# Patient Record
Sex: MALE | Race: WHITE | NOT HISPANIC OR LATINO | URBAN - METROPOLITAN AREA
[De-identification: names, ages, dates, MRNs, and addresses within clinical notes are randomized per-mention and may not be internally consistent; named-entity substitution may affect disease eponyms.]

---

## 2017-06-25 PROBLEM — Z00.00 ENCOUNTER FOR PREVENTIVE HEALTH EXAMINATION: Status: ACTIVE | Noted: 2017-06-25

## 2017-06-27 ENCOUNTER — EMERGENCY (EMERGENCY)
Facility: HOSPITAL | Age: 42
LOS: 0 days | Discharge: HOME | End: 2017-06-27
Attending: STUDENT IN AN ORGANIZED HEALTH CARE EDUCATION/TRAINING PROGRAM
Payer: OTHER MISCELLANEOUS

## 2017-06-27 VITALS
TEMPERATURE: 98 F | HEIGHT: 68 IN | DIASTOLIC BLOOD PRESSURE: 56 MMHG | HEART RATE: 91 BPM | OXYGEN SATURATION: 97 % | RESPIRATION RATE: 16 BRPM | SYSTOLIC BLOOD PRESSURE: 96 MMHG | WEIGHT: 270.07 LBS

## 2017-06-27 PROCEDURE — 73562 X-RAY EXAM OF KNEE 3: CPT | Mod: 26,RT

## 2017-06-27 PROCEDURE — 72100 X-RAY EXAM L-S SPINE 2/3 VWS: CPT | Mod: 26

## 2017-06-27 PROCEDURE — 72070 X-RAY EXAM THORAC SPINE 2VWS: CPT | Mod: 26

## 2017-06-27 PROCEDURE — 99284 EMERGENCY DEPT VISIT MOD MDM: CPT

## 2017-06-27 PROCEDURE — 73130 X-RAY EXAM OF HAND: CPT | Mod: 26,RT

## 2017-06-27 NOTE — ED PROVIDER NOTE - CARE PLAN
Principal Discharge DX:	Back strain  Secondary Diagnosis:	Contusion of right hand, sequela  Secondary Diagnosis:	Contusion of right knee, sequela
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
## 2017-06-28 DIAGNOSIS — S60.221A CONTUSION OF RIGHT HAND, INITIAL ENCOUNTER: ICD-10-CM
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
## 2017-06-28 DIAGNOSIS — S39.012A STRAIN OF MUSCLE, FASCIA AND TENDON OF LOWER BACK, INITIAL ENCOUNTER: ICD-10-CM
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
## 2017-06-28 DIAGNOSIS — S80.01XA CONTUSION OF RIGHT KNEE, INITIAL ENCOUNTER: ICD-10-CM

## 2017-06-28 DIAGNOSIS — M54.5 LOW BACK PAIN: ICD-10-CM

## 2017-06-28 DIAGNOSIS — E03.9 HYPOTHYROIDISM, UNSPECIFIED: ICD-10-CM

## 2017-06-30 ENCOUNTER — EMERGENCY (EMERGENCY)
Facility: HOSPITAL | Age: 42
LOS: 0 days | Discharge: HOME | End: 2017-07-01

## 2017-06-30 DIAGNOSIS — E03.9 HYPOTHYROIDISM, UNSPECIFIED: ICD-10-CM

## 2017-06-30 DIAGNOSIS — K08.89 OTHER SPECIFIED DISORDERS OF TEETH AND SUPPORTING STRUCTURES: ICD-10-CM

## 2017-06-30 DIAGNOSIS — Z88.0 ALLERGY STATUS TO PENICILLIN: ICD-10-CM

## 2017-06-30 DIAGNOSIS — Z79.899 OTHER LONG TERM (CURRENT) DRUG THERAPY: ICD-10-CM

## 2017-06-30 DIAGNOSIS — K04.7 PERIAPICAL ABSCESS WITHOUT SINUS: ICD-10-CM

## 2017-07-31 ENCOUNTER — EMERGENCY (EMERGENCY)
Facility: HOSPITAL | Age: 42
LOS: 0 days | Discharge: HOME | End: 2017-07-31

## 2017-07-31 DIAGNOSIS — E03.9 HYPOTHYROIDISM, UNSPECIFIED: ICD-10-CM

## 2017-07-31 DIAGNOSIS — Z79.899 OTHER LONG TERM (CURRENT) DRUG THERAPY: ICD-10-CM

## 2017-07-31 DIAGNOSIS — Z76.0 ENCOUNTER FOR ISSUE OF REPEAT PRESCRIPTION: ICD-10-CM

## 2017-07-31 DIAGNOSIS — Z88.0 ALLERGY STATUS TO PENICILLIN: ICD-10-CM

## 2017-08-30 ENCOUNTER — EMERGENCY (EMERGENCY)
Facility: HOSPITAL | Age: 42
LOS: 1 days | Discharge: HOME | End: 2017-08-30
Attending: EMERGENCY MEDICINE | Admitting: EMERGENCY MEDICINE
Payer: SELF-PAY

## 2017-08-30 VITALS
DIASTOLIC BLOOD PRESSURE: 69 MMHG | WEIGHT: 275.58 LBS | OXYGEN SATURATION: 98 % | SYSTOLIC BLOOD PRESSURE: 106 MMHG | HEART RATE: 82 BPM | TEMPERATURE: 98 F | RESPIRATION RATE: 19 BRPM

## 2017-08-30 PROCEDURE — 99281 EMR DPT VST MAYX REQ PHY/QHP: CPT

## 2017-08-30 RX ORDER — LEVOTHYROXINE SODIUM 125 MCG
1 TABLET ORAL
Qty: 30 | Refills: 0 | OUTPATIENT
Start: 2017-08-30 | End: 2017-09-29

## 2017-08-30 NOTE — ED PROVIDER NOTE - CHPI ED SYMPTOMS NEG
no numbness/no dizziness/no fever/no nausea/no decreased eating/drinking/no pain/no chills/no tingling/no vomiting/no weakness

## 2017-08-30 NOTE — ED PROVIDER NOTE - OBJECTIVE STATEMENT
41 male presents to ER for medication refill of levothyroxine 150mcg, states he has a history of hashimoto's, currently does not have health insurance and cannot see his doctor at this time. Patient currently denies any symptoms and states he will get insurance and will f/u with an endocrinologist.

## 2017-09-03 DIAGNOSIS — Z76.0 ENCOUNTER FOR ISSUE OF REPEAT PRESCRIPTION: ICD-10-CM

## 2017-09-18 ENCOUNTER — APPOINTMENT (OUTPATIENT)
Dept: INTERNAL MEDICINE | Facility: CLINIC | Age: 42
End: 2017-09-18

## 2017-09-18 ENCOUNTER — RESULT REVIEW (OUTPATIENT)
Age: 42
End: 2017-09-18

## 2017-09-18 ENCOUNTER — OUTPATIENT (OUTPATIENT)
Dept: OUTPATIENT SERVICES | Facility: HOSPITAL | Age: 42
LOS: 1 days | Discharge: HOME | End: 2017-09-18

## 2017-09-18 VITALS
HEART RATE: 49 BPM | SYSTOLIC BLOOD PRESSURE: 125 MMHG | HEIGHT: 68 IN | BODY MASS INDEX: 40.92 KG/M2 | DIASTOLIC BLOOD PRESSURE: 71 MMHG | WEIGHT: 270 LBS

## 2017-09-18 DIAGNOSIS — E03.9 HYPOTHYROIDISM, UNSPECIFIED: ICD-10-CM

## 2017-09-18 DIAGNOSIS — Z86.69 PERSONAL HISTORY OF OTHER DISEASES OF THE NERVOUS SYSTEM AND SENSE ORGANS: ICD-10-CM

## 2017-09-18 DIAGNOSIS — E78.4 OTHER HYPERLIPIDEMIA: ICD-10-CM

## 2017-09-18 DIAGNOSIS — Z87.891 PERSONAL HISTORY OF NICOTINE DEPENDENCE: ICD-10-CM

## 2017-09-18 DIAGNOSIS — Z83.49 FAMILY HISTORY OF OTHER ENDOCRINE, NUTRITIONAL AND METABOLIC DISEASES: ICD-10-CM

## 2017-09-18 DIAGNOSIS — R35.0 FREQUENCY OF MICTURITION: ICD-10-CM

## 2017-09-18 DIAGNOSIS — M54.5 LOW BACK PAIN: ICD-10-CM

## 2017-09-18 RX ORDER — LEVOTHYROXINE SODIUM 0.15 MG/1
150 TABLET ORAL DAILY
Qty: 30 | Refills: 3 | Status: ACTIVE | COMMUNITY
Start: 1900-01-01 | End: 1900-01-01

## 2017-09-19 LAB
ANION GAP SERPL CALC-SCNC: 8 MEQ/L
BASOPHILS # BLD: 0.04 TH/MM3
BASOPHILS NFR BLD: 0.4 %
BUN SERPL-MCNC: 11 MG/DL
BUN/CREAT SERPL: 10.7 %
CALCIUM SERPL-MCNC: 9.7 MG/DL
CHLORIDE SERPL-SCNC: 102 MEQ/L
CHOLEST SERPL-MCNC: 212 MG/DL
CO2 SERPL-SCNC: 28 MEQ/L
CREAT SERPL-MCNC: 1.03 MG/DL
DIFFERENTIAL METHOD BLD: NORMAL
EOSINOPHIL # BLD: 0.25 TH/MM3
EOSINOPHIL NFR BLD: 2.4 %
ERYTHROCYTE [DISTWIDTH] IN BLOOD BY AUTOMATED COUNT: 14.4 %
ESTIMATED AVERGAGE GLUCOSE (NORTH): 117 MG/DL
GFR SERPL CREATININE-BSD FRML MDRD: 79
GLUCOSE SERPL-MCNC: 100 MG/DL
GRANULOCYTES # BLD: 6.01 TH/MM3
GRANULOCYTES NFR BLD: 57.5 %
HBA1C MFR BLD: 5.7 %
HCT VFR BLD AUTO: 43 %
HDLC SERPL-MCNC: 40 MG/DL
HDLC SERPL: 5.3
HGB BLD-MCNC: 14.8 G/DL
IMM GRANULOCYTES # BLD: 0.02 TH/MM3
IMM GRANULOCYTES NFR BLD: 0.2 %
LDLC SERPL DIRECT ASSAY-MCNC: 147 MG/DL
LYMPHOCYTES # BLD: 3.34 TH/MM3
LYMPHOCYTES NFR BLD: 31.9 %
MCH RBC QN AUTO: 33 PG
MCHC RBC AUTO-ENTMCNC: 34.4 G/DL
MCV RBC AUTO: 96 FL
MONOCYTES # BLD: 0.8 TH/MM3
MONOCYTES NFR BLD: 7.6 %
PLATELET # BLD: 227 TH/MM3
PMV BLD AUTO: 11.1 FL
POTASSIUM SERPL-SCNC: 4.3 MMOL/L
RBC # BLD AUTO: 4.48 MIL/MM3
SODIUM SERPL-SCNC: 138 MEQ/L
T4 FREE SERPL-MCNC: 1.6 NG/DL
TRIGL SERPL-MCNC: 127 MG/DL
TSH SERPL DL<=0.005 MIU/L-ACNC: 1.19 UIU/ML
VLDLC SERPL-MCNC: 25 MG/DL
WBC # BLD: 10.46 TH/MM3

## 2018-08-05 ENCOUNTER — EMERGENCY (EMERGENCY)
Facility: HOSPITAL | Age: 43
LOS: 0 days | Discharge: HOME | End: 2018-08-05
Attending: EMERGENCY MEDICINE
Payer: SELF-PAY

## 2018-08-05 VITALS
DIASTOLIC BLOOD PRESSURE: 73 MMHG | TEMPERATURE: 98 F | RESPIRATION RATE: 17 BRPM | OXYGEN SATURATION: 100 % | HEART RATE: 64 BPM | WEIGHT: 240.08 LBS | HEIGHT: 68 IN | SYSTOLIC BLOOD PRESSURE: 118 MMHG

## 2018-08-05 DIAGNOSIS — Z91.14 PATIENT'S OTHER NONCOMPLIANCE WITH MEDICATION REGIMEN: ICD-10-CM

## 2018-08-05 DIAGNOSIS — Z88.0 ALLERGY STATUS TO PENICILLIN: ICD-10-CM

## 2018-08-05 DIAGNOSIS — E03.9 HYPOTHYROIDISM, UNSPECIFIED: ICD-10-CM

## 2018-08-05 PROCEDURE — 99053 MED SERV 10PM-8AM 24 HR FAC: CPT

## 2018-08-05 PROCEDURE — 99283 EMERGENCY DEPT VISIT LOW MDM: CPT | Mod: 25

## 2018-08-05 RX ORDER — LEVOTHYROXINE SODIUM 125 MCG
1 TABLET ORAL
Qty: 30 | Refills: 0 | OUTPATIENT
Start: 2018-08-05 | End: 2018-09-03

## 2018-08-05 NOTE — ED PROVIDER NOTE - OBJECTIVE STATEMENT
Pt is a 43 yo gentleman with a pmhx of hypothyroidism who presents to the ED after he ran out of thyroid medication. Ran out 3 days ago. No other complaints.

## 2018-08-05 NOTE — ED ADULT NURSE NOTE - NSIMPLEMENTINTERV_GEN_ALL_ED
Implemented All Universal Safety Interventions:  [place] to call system. Call bell, personal items and telephone within reach. Instruct patient to call for assistance. Room bathroom lighting operational. Non-slip footwear when patient is off stretcher. Physically safe environment: no spills, clutter or unnecessary equipment. Stretcher in lowest position, wheels locked, appropriate side rails in place.

## 2018-08-05 NOTE — ED ADULT NURSE NOTE - CHPI ED NUR SYMPTOMS NEG
no fever/no nausea/no vomiting/no chills/no decreased eating/drinking/no weakness/no dizziness/no tingling/no pain

## 2018-09-19 ENCOUNTER — EMERGENCY (EMERGENCY)
Facility: HOSPITAL | Age: 43
LOS: 0 days | Discharge: HOME | End: 2018-09-19
Attending: EMERGENCY MEDICINE
Payer: SELF-PAY

## 2018-09-19 VITALS
WEIGHT: 229.94 LBS | HEART RATE: 55 BPM | OXYGEN SATURATION: 100 % | SYSTOLIC BLOOD PRESSURE: 104 MMHG | RESPIRATION RATE: 18 BRPM | TEMPERATURE: 98 F | DIASTOLIC BLOOD PRESSURE: 63 MMHG | HEIGHT: 68 IN

## 2018-09-19 DIAGNOSIS — Z76.0 ENCOUNTER FOR ISSUE OF REPEAT PRESCRIPTION: ICD-10-CM

## 2018-09-19 DIAGNOSIS — E03.9 HYPOTHYROIDISM, UNSPECIFIED: ICD-10-CM

## 2018-09-19 DIAGNOSIS — Z88.0 ALLERGY STATUS TO PENICILLIN: ICD-10-CM

## 2018-09-19 LAB — TSH SERPL-MCNC: 12.3 UIU/ML — HIGH (ref 0.36–3.74)

## 2018-09-19 PROCEDURE — 99284 EMERGENCY DEPT VISIT MOD MDM: CPT

## 2018-09-19 RX ORDER — LEVOTHYROXINE SODIUM 125 MCG
1 TABLET ORAL
Qty: 30 | Refills: 0 | OUTPATIENT
Start: 2018-09-19 | End: 2018-10-18

## 2018-09-19 NOTE — ED PROVIDER NOTE - ATTENDING CONTRIBUTION TO CARE
41 yo male with pmh hypothyroid presents for refill of synthroid. Pt states hasn't had it checked in 1 year. Pt has been coming to ER for refills.  Pt otherwise asymptomatic.     will check TFTs, clinic list given as pt advised should not be coming to Er for refills.

## 2018-09-19 NOTE — ED PROVIDER NOTE - MEDICAL DECISION MAKING DETAILS
labs drawn and will be follow up by ER pa on call. Pt instructed to see pcp in [facility] for further evaluation and medication

## 2018-09-19 NOTE — ED PROVIDER NOTE - OBJECTIVE STATEMENT
This is a 41 yo M w a pmhx of hypothyroidism comes to Ed for refill of type medication. Pt last dose was 5 days ago. Pt hasn't seen a pcp in over a year. Denies chest pain, sob, fatigue, palpation.

## 2018-09-19 NOTE — ED ADULT NURSE NOTE - NSIMPLEMENTINTERV_GEN_ALL_ED
Implemented All Universal Safety Interventions:  [place] to call system. Call bell, personal items and telephone within reach. Instruct patient to call for assistance. Room bathroom lighting operational. Non-slip footwear when patient is off stretcher. Physically safe environment: no spills, clutter or unnecessary equipment. Stretcher in lowest position, wheels locked, appropriate side rails in place.

## 2018-09-20 LAB
T3 SERPL-MCNC: 96 NG/DL — SIGNIFICANT CHANGE UP (ref 80–200)
T4 AB SER-ACNC: 5.8 UG/DL — SIGNIFICANT CHANGE UP (ref 4.6–12)

## 2018-09-20 RX ORDER — LEVOTHYROXINE SODIUM 125 MCG
1 TABLET ORAL
Qty: 30 | Refills: 0 | OUTPATIENT
Start: 2018-09-20 | End: 2018-10-19

## 2018-11-12 ENCOUNTER — EMERGENCY (EMERGENCY)
Facility: HOSPITAL | Age: 43
LOS: 0 days | Discharge: HOME | End: 2018-11-12
Attending: EMERGENCY MEDICINE
Payer: SELF-PAY

## 2018-11-12 VITALS
TEMPERATURE: 98 F | RESPIRATION RATE: 16 BRPM | OXYGEN SATURATION: 99 % | HEART RATE: 58 BPM | WEIGHT: 229.94 LBS | HEIGHT: 68 IN | DIASTOLIC BLOOD PRESSURE: 65 MMHG | SYSTOLIC BLOOD PRESSURE: 110 MMHG

## 2018-11-12 DIAGNOSIS — Z76.0 ENCOUNTER FOR ISSUE OF REPEAT PRESCRIPTION: ICD-10-CM

## 2018-11-12 DIAGNOSIS — E03.9 HYPOTHYROIDISM, UNSPECIFIED: ICD-10-CM

## 2018-11-12 PROCEDURE — 99283 EMERGENCY DEPT VISIT LOW MDM: CPT

## 2018-11-12 RX ORDER — LEVOTHYROXINE SODIUM 125 MCG
1 TABLET ORAL
Qty: 30 | Refills: 0 | OUTPATIENT
Start: 2018-11-12 | End: 2018-12-11

## 2018-11-12 NOTE — ED PROVIDER NOTE - MEDICAL DECISION MAKING DETAILS
Patient advised to need to have primary care for medical follow up. In addition to [facility], patient given info for [facility] for follow up.

## 2018-11-12 NOTE — ED ADULT NURSE NOTE - NSIMPLEMENTINTERV_GEN_ALL_ED
Implemented All Universal Safety Interventions:  [place] to call system. Call bell, personal items and telephone within reach. Instruct patient to call for assistance. Room bathroom lighting operational. Non-slip footwear when patient is off stretcher. Physically safe environment: no spills, clutter or unnecessary equipment. Stretcher in lowest position, wheels locked, appropriate side rails in place.

## 2018-11-12 NOTE — ED ADULT NURSE NOTE - CAS ELECT INFOMATION PROVIDED
discharged home, instructed to follow up with ambulatory care clinic, verbalized  understanding of instructions/DC instructions

## 2018-11-24 NOTE — ED PROVIDER NOTE - TEMPLATE, MLM
Ventricular Rate : 58   Atrial Rate : 58   P-R Interval : 220   QRS Duration : 82   Q-T Interval : 458   QTC Calculation(Bezet) : 449   P Axis : 16   R Axis : -8   T Axis : 19   Diagnosis : Sinus bradycardia with 1st degree A-V block~Inferior infarct (cited on or before 03-AUG-2016)~Poor R wave progression~****Abnormal ECG****~When compared with ECG of 06-AUG-2016 06:37,~No significant change was found~Confirmed by VENKATA BOLES MASOOD ([id number]) on 4/24/2017 5:27:56 PM      General

## 2018-12-22 ENCOUNTER — EMERGENCY (EMERGENCY)
Facility: HOSPITAL | Age: 43
LOS: 0 days | Discharge: HOME | End: 2018-12-22
Attending: EMERGENCY MEDICINE
Payer: SELF-PAY

## 2018-12-22 VITALS
TEMPERATURE: 98 F | OXYGEN SATURATION: 100 % | DIASTOLIC BLOOD PRESSURE: 69 MMHG | SYSTOLIC BLOOD PRESSURE: 100 MMHG | HEART RATE: 81 BPM | RESPIRATION RATE: 18 BRPM

## 2018-12-22 VITALS
HEIGHT: 68 IN | DIASTOLIC BLOOD PRESSURE: 70 MMHG | SYSTOLIC BLOOD PRESSURE: 111 MMHG | OXYGEN SATURATION: 100 % | WEIGHT: 220.02 LBS | HEART RATE: 74 BPM | RESPIRATION RATE: 17 BRPM | TEMPERATURE: 98 F

## 2018-12-22 DIAGNOSIS — E03.9 HYPOTHYROIDISM, UNSPECIFIED: ICD-10-CM

## 2018-12-22 DIAGNOSIS — Z76.0 ENCOUNTER FOR ISSUE OF REPEAT PRESCRIPTION: ICD-10-CM

## 2018-12-22 PROCEDURE — 99283 EMERGENCY DEPT VISIT LOW MDM: CPT | Mod: 25

## 2018-12-22 PROCEDURE — 99053 MED SERV 10PM-8AM 24 HR FAC: CPT

## 2018-12-22 RX ORDER — LEVOTHYROXINE SODIUM 125 MCG
1 TABLET ORAL
Qty: 30 | Refills: 0 | OUTPATIENT
Start: 2018-12-22 | End: 2019-01-20

## 2018-12-22 NOTE — ED PROVIDER NOTE - OBJECTIVE STATEMENT
Pt is a 42 yo gentleman with a pmhx of hypothyroidism who presents to the ED for a medication refill. Has no complaints, but does not have pmd or insurance so came for refill.

## 2019-01-31 ENCOUNTER — EMERGENCY (EMERGENCY)
Facility: HOSPITAL | Age: 44
LOS: 0 days | Discharge: HOME | End: 2019-01-31
Attending: EMERGENCY MEDICINE
Payer: SELF-PAY

## 2019-01-31 VITALS
TEMPERATURE: 98 F | WEIGHT: 210.1 LBS | HEIGHT: 68 IN | DIASTOLIC BLOOD PRESSURE: 56 MMHG | SYSTOLIC BLOOD PRESSURE: 124 MMHG | OXYGEN SATURATION: 98 % | HEART RATE: 68 BPM

## 2019-01-31 DIAGNOSIS — Z76.0 ENCOUNTER FOR ISSUE OF REPEAT PRESCRIPTION: ICD-10-CM

## 2019-01-31 DIAGNOSIS — E03.9 HYPOTHYROIDISM, UNSPECIFIED: ICD-10-CM

## 2019-01-31 DIAGNOSIS — Z88.0 ALLERGY STATUS TO PENICILLIN: ICD-10-CM

## 2019-01-31 PROCEDURE — 99283 EMERGENCY DEPT VISIT LOW MDM: CPT

## 2019-01-31 RX ORDER — LEVOTHYROXINE SODIUM 125 MCG
1 TABLET ORAL
Qty: 30 | Refills: 0 | OUTPATIENT
Start: 2019-01-31 | End: 2019-03-01

## 2019-01-31 NOTE — ED PROVIDER NOTE - OBJECTIVE STATEMENT
this is a 42 yo m w no pmhx of  hypothyroidism her for synthroid refill x 1 day. Denies nausea, vomiting, fever, chest pain, palpitation. Pt sts he has an issue with insurance

## 2019-01-31 NOTE — ED ADULT NURSE NOTE - OBJECTIVE STATEMENT
Patient here for medication refill of levothyroxine. Patient is not symptomatic, states he can't apply for medicare because he works and he missed the deadline for work to apply for healthcare. pt is not hypotensive or bradycardiac

## 2019-03-23 ENCOUNTER — EMERGENCY (EMERGENCY)
Facility: HOSPITAL | Age: 44
LOS: 0 days | Discharge: HOME | End: 2019-03-23
Attending: EMERGENCY MEDICINE
Payer: SELF-PAY

## 2019-03-23 VITALS
OXYGEN SATURATION: 100 % | TEMPERATURE: 98 F | HEIGHT: 68 IN | RESPIRATION RATE: 18 BRPM | WEIGHT: 225.09 LBS | HEART RATE: 58 BPM | SYSTOLIC BLOOD PRESSURE: 104 MMHG | DIASTOLIC BLOOD PRESSURE: 60 MMHG

## 2019-03-23 DIAGNOSIS — Z88.0 ALLERGY STATUS TO PENICILLIN: ICD-10-CM

## 2019-03-23 DIAGNOSIS — Z76.0 ENCOUNTER FOR ISSUE OF REPEAT PRESCRIPTION: ICD-10-CM

## 2019-03-23 PROCEDURE — 99053 MED SERV 10PM-8AM 24 HR FAC: CPT

## 2019-03-23 PROCEDURE — 99283 EMERGENCY DEPT VISIT LOW MDM: CPT | Mod: 25

## 2019-03-23 RX ORDER — LEVOTHYROXINE SODIUM 125 MCG
1 TABLET ORAL
Qty: 30 | Refills: 0
Start: 2019-03-23 | End: 2019-04-21

## 2019-03-23 NOTE — ED PROVIDER NOTE - OBJECTIVE STATEMENT
42yo male with pmh hypothyroid presents for medication refills. Pt otherwise asymptomatic.    ROS: No fever/chills. No photophobia/eye pain/changes in vision, No ear pain/sore throat/dysphagia, No chest pain/palpitations. No SOB/cough/stridor. No abdominal pain, N/V/D, no black/bloody bm. No dysuria/frequency/discharge, No headache. No Dizziness.  No rash.  No numbness/tingling/weakness.

## 2019-03-23 NOTE — ED ADULT NURSE NOTE - NSIMPLEMENTINTERV_GEN_ALL_ED
Implemented All Universal Safety Interventions:  [place] to call system. Call bell, personal items and telephone within reach. Instruct patient to call for assistance. Room bathroom lighting operational. Non-slip footwear when patient is off stretcher. Physically safe environment: no spills, clutter or unnecessary equipment. Stretcher in lowest position, wheels locked, appropriate side rails in place.

## 2019-03-23 NOTE — ED PROVIDER NOTE - PHYSICAL EXAMINATION
Gen: Alert, Well appearing. NAD    Head: NC, AT, PERRL, EOMI, normal lids/conjunctiva   ENT:  patent oropharynx without erythema/exudate, uvula midline  Neck: supple, no tenderness/meningismus/JVD   Pulm: Bilateral clear BS, normal resp effort, no wheeze/stridor/retractions  CV: RRR, no M/R/G, +dist pulses   Abd: soft, NT/ND, +BS, no guarding/rebound tenderness  Mskel: no edema/erythema/cyanosis   Skin: no rash   Neuro: AAOx3, no sensory/motor deficits,

## 2019-05-09 ENCOUNTER — EMERGENCY (EMERGENCY)
Facility: HOSPITAL | Age: 44
LOS: 0 days | Discharge: HOME | End: 2019-05-09
Payer: SELF-PAY

## 2019-05-09 VITALS
TEMPERATURE: 98 F | HEIGHT: 68 IN | WEIGHT: 220.02 LBS | SYSTOLIC BLOOD PRESSURE: 129 MMHG | DIASTOLIC BLOOD PRESSURE: 78 MMHG | OXYGEN SATURATION: 98 % | RESPIRATION RATE: 18 BRPM | HEART RATE: 61 BPM

## 2019-05-09 DIAGNOSIS — Z87.891 PERSONAL HISTORY OF NICOTINE DEPENDENCE: ICD-10-CM

## 2019-05-09 DIAGNOSIS — Z76.0 ENCOUNTER FOR ISSUE OF REPEAT PRESCRIPTION: ICD-10-CM

## 2019-05-09 DIAGNOSIS — E03.9 HYPOTHYROIDISM, UNSPECIFIED: ICD-10-CM

## 2019-05-09 DIAGNOSIS — Z88.0 ALLERGY STATUS TO PENICILLIN: ICD-10-CM

## 2019-05-09 PROCEDURE — 99283 EMERGENCY DEPT VISIT LOW MDM: CPT

## 2019-05-09 RX ORDER — LEVOTHYROXINE SODIUM 125 MCG
1 TABLET ORAL
Qty: 30 | Refills: 0
Start: 2019-05-09 | End: 2019-06-07

## 2019-05-09 RX ORDER — LEVOTHYROXINE SODIUM 125 MCG
1 TABLET ORAL
Qty: 0 | Refills: 0 | DISCHARGE

## 2019-05-09 NOTE — ED PROVIDER NOTE - CLINICAL SUMMARY MEDICAL DECISION MAKING FREE TEXT BOX
pt here for synthroid refill, pt otherwise asx, rx sent, advised pt at length re importance of pcp/clinic fu, pt understands, pt is otherwise well appearing, vss

## 2019-05-09 NOTE — ED PROVIDER NOTE - PHYSICAL EXAMINATION
Gen: Alert, NAD, well appearing  Head: NC, AT, PERRL, EOMI, normal lids/conjunctiva  ENT: B TM WNL, normal hearing, patent oropharynx without erythema/exudate, uvula midline  Neck: +supple, no tenderness/meningismus/JVD, +Trachea midline  Pulm: Bilateral BS, normal resp effort, no wheeze/stridor/retractions  CV: RRR, no M/R/G, +dist pulses  Abd: soft, NT/ND, +BS, no hepatosplenomegaly  Mskel: no edema/erythema/cyanosis  Skin: no rash  Neuro: AAOx3, no sensory/motor deficits, CN 2-12 intact

## 2019-05-09 NOTE — ED ADULT TRIAGE NOTE - CHIEF COMPLAINT QUOTE
Patient reports "I take levothyroxine for my thyroid. I ran out a week ago. I need a prescription. I don't have insurance."

## 2019-05-09 NOTE — ED PROVIDER NOTE - OBJECTIVE STATEMENT
42 y/o male with PMH hypothyroidism here for medication refill. pt states he takes 150mcg daily, he ran out and is in process of getting new insurance so wasn't able to see pcp. pt otherwise has no other complaints.    ROS: No fever/chills. No eye pain/changes in vision, No ear pain/sore throat/dysphagia, No chest pain/palpitations. No SOB/cough/. No abdominal pain, N/V/D, no black/bloody bm. No dysuria/frequency/discharge, No headache. No Dizziness.    No rashes or breaks in skin. No numbness/tingling/weakness.

## 2019-05-09 NOTE — ED ADULT NURSE NOTE - NSIMPLEMENTINTERV_GEN_ALL_ED
Implemented All Universal Safety Interventions:  [place] to call system. Call bell, personal items and telephone within reach. Instruct patient to call for assistance. Room bathroom lighting operational. Non-slip footwear when patient is off stretcher. Physically safe environment: no spills, clutter or unnecessary equipment. Stretcher in lowest position, wheels locked, appropriate side rails in place.

## 2019-07-18 ENCOUNTER — EMERGENCY (EMERGENCY)
Facility: HOSPITAL | Age: 44
LOS: 1 days | Discharge: HOME | End: 2019-07-18
Attending: EMERGENCY MEDICINE | Admitting: EMERGENCY MEDICINE
Payer: SELF-PAY

## 2019-07-18 VITALS
RESPIRATION RATE: 18 BRPM | HEART RATE: 69 BPM | WEIGHT: 225.09 LBS | SYSTOLIC BLOOD PRESSURE: 106 MMHG | DIASTOLIC BLOOD PRESSURE: 62 MMHG | OXYGEN SATURATION: 98 % | TEMPERATURE: 98 F

## 2019-07-18 PROCEDURE — 99281 EMR DPT VST MAYX REQ PHY/QHP: CPT

## 2019-07-18 RX ORDER — LEVOTHYROXINE SODIUM 125 MCG
1 TABLET ORAL
Qty: 30 | Refills: 0
Start: 2019-07-18 | End: 2019-08-16

## 2019-07-18 NOTE — ED ADULT NURSE NOTE - NSIMPLEMENTINTERV_GEN_ALL_ED
Implemented All Universal Safety Interventions:  [place] to call system. Call bell, personal items and telephone within reach. Instruct patient to call for assistance. Room bathroom lighting operational. Non-slip footwear when patient is off stretcher. Physically safe environment: no spills, clutter or unnecessary equipment. Stretcher in lowest position, wheels locked, appropriate side rails in place.

## 2019-07-18 NOTE — ED PROVIDER NOTE - ATTENDING CONTRIBUTION TO CARE
I have personally performed a face to face diagnostic evaluation on this patient.  I have reviewed the PA note and agree with the history, exam, and plan of care, except as noted.  History and Exam by me shows here for Rx refill of synthroid and ran out of med about 1 month ago bc of insurance.  pt will get his insurance back at the end of August.  No other complaint.

## 2019-07-18 NOTE — ED PROVIDER NOTE - OBJECTIVE STATEMENT
pt is a 44yo male with pmhx of hypothyroid presents for medication refill. pt reports he has not taken his synthroid 150mcg in "about" 1 month. pt reports increased fatigue without cp or sob. pt has not seen endo for 1 year. pt denies fever, cp, sob.

## 2019-07-18 NOTE — ED PROVIDER NOTE - CARE PROVIDER_API CALL
Perlman, Craig D ()  [address], [address]  [place] [address]  Phone: [phone]  Fax: [phone]  Follow Up Time: 4-6 Days

## 2019-07-18 NOTE — ED PROVIDER NOTE - CLINICAL SUMMARY MEDICAL DECISION MAKING FREE TEXT BOX
consulted dr c. perlman endo who advised pt can take synthroid 150mcg every other day for 2 weeks and then every day there after. pt made aware. pt advised to follow up in his office.. All questions answered and concerns addressed. pt verbalized understanding and agreement with plan and dx. pt advised on next step and when/where to follow up. pt advised on all take home and otc medications. pt advised to follow up with PMD. pt advised to return to ed for worsenng symptoms including fever, cp, sob. will dc.

## 2019-09-14 NOTE — ED ADULT NURSE NOTE - CAS EDN DISCHARGE ASSESSMENT
Patient medication regimen entered per what patient states she is taking. Alert and oriented to person, place and time

## 2019-10-03 ENCOUNTER — EMERGENCY (EMERGENCY)
Facility: HOSPITAL | Age: 44
LOS: 1 days | Discharge: HOME | End: 2019-10-03
Attending: EMERGENCY MEDICINE | Admitting: EMERGENCY MEDICINE
Payer: SELF-PAY

## 2019-10-03 VITALS
RESPIRATION RATE: 14 BRPM | HEART RATE: 61 BPM | SYSTOLIC BLOOD PRESSURE: 103 MMHG | WEIGHT: 220.02 LBS | OXYGEN SATURATION: 98 % | TEMPERATURE: 98 F | HEIGHT: 68 IN | DIASTOLIC BLOOD PRESSURE: 73 MMHG

## 2019-10-03 PROCEDURE — 99281 EMR DPT VST MAYX REQ PHY/QHP: CPT

## 2019-10-03 PROCEDURE — 99282 EMERGENCY DEPT VISIT SF MDM: CPT

## 2019-10-03 RX ORDER — LEVOTHYROXINE SODIUM 125 MCG
1 TABLET ORAL
Qty: 30 | Refills: 0
Start: 2019-10-03 | End: 2019-11-01

## 2019-10-03 NOTE — ED ADULT NURSE NOTE - NSIMPLEMENTINTERV_GEN_ALL_ED
Implemented All Universal Safety Interventions:  [place] to call system. Call bell, personal items and telephone within reach. Instruct patient to call for assistance. Room bathroom lighting operational. Non-slip footwear when patient is off stretcher. Physically safe environment: no spills, clutter or unnecessary equipment. Stretcher in lowest position, wheels locked, appropriate side rails in place.

## 2019-10-03 NOTE — ED PROVIDER NOTE - PATIENT PORTAL LINK FT
You can access the FollowMyHealth Patient Portal offered by [facility] by registering at the following website: [email or web address]. By joining [employer]’s FollowMyHealth portal, you will also be able to view your health information using other applications (apps) compatible with our system.

## 2019-11-23 ENCOUNTER — EMERGENCY (EMERGENCY)
Facility: HOSPITAL | Age: 44
LOS: 0 days | Discharge: HOME | End: 2019-11-23
Attending: EMERGENCY MEDICINE
Payer: SELF-PAY

## 2019-11-23 VITALS
WEIGHT: 225.09 LBS | SYSTOLIC BLOOD PRESSURE: 115 MMHG | HEIGHT: 68 IN | RESPIRATION RATE: 16 BRPM | HEART RATE: 69 BPM | TEMPERATURE: 98 F | OXYGEN SATURATION: 100 % | DIASTOLIC BLOOD PRESSURE: 66 MMHG

## 2019-11-23 DIAGNOSIS — Z76.0 ENCOUNTER FOR ISSUE OF REPEAT PRESCRIPTION: ICD-10-CM

## 2019-11-23 DIAGNOSIS — Z88.0 ALLERGY STATUS TO PENICILLIN: ICD-10-CM

## 2019-11-23 DIAGNOSIS — E03.9 HYPOTHYROIDISM, UNSPECIFIED: ICD-10-CM

## 2019-11-23 PROCEDURE — 99283 EMERGENCY DEPT VISIT LOW MDM: CPT

## 2019-11-23 RX ORDER — LEVOTHYROXINE SODIUM 125 MCG
1 TABLET ORAL
Qty: 30 | Refills: 1
Start: 2019-11-23 | End: 2020-01-21

## 2019-11-23 NOTE — ED PROVIDER NOTE - CLINICAL SUMMARY MEDICAL DECISION MAKING FREE TEXT BOX
paper prescription given as requested so patient can shop around for lowest price; clinic follow up recommended for reassessment. Patient is aware of signs/symptoms to return to the emergency department.

## 2019-11-23 NOTE — ED PROVIDER NOTE - OBJECTIVE STATEMENT
Patient states has not taken thyroid medication in two weeks and requesting prescription; no other complaints.

## 2019-11-23 NOTE — ED PROVIDER NOTE - NSFOLLOWUPCLINICS_GEN_ALL_ED_FT
[facility] General Internal Medicine  General Internal Medicine  [address] [id number]  Phone: [phone]  Fax:   Follow Up Time:

## 2019-11-23 NOTE — ED PROVIDER NOTE - PATIENT PORTAL LINK FT
You can access the FollowMyHealth Patient Portal offered by [facility] by registering at the following website: [email or web address]. By joining [employer]’s FollowMyHealth portal, you will also be able to view your health information using other applications (apps) compatible with our system.

## 2020-03-04 NOTE — ED ADULT NURSE NOTE - PAIN RATING/NUMBER SCALE (0-10): REST
PATIENT INFORMATION        Follow-Up  - Return in 1 year for your yearly well visit.    13-17 years old Health and Safety Tips - The following hyperlinks are available to access via [email or web address]    Parent Education from [employer] Parent    Educación para padres sobre niños sanos    Additional Educational Resources:  For additional resources regarding your symptoms, diagnosis, or further health information, please visit the Discover a Healthier You section on [email or web address] or the Online Health Resources section in [email or web address].     0

## 2020-10-05 ENCOUNTER — EMERGENCY (EMERGENCY)
Facility: HOSPITAL | Age: 45
LOS: 0 days | Discharge: HOME | End: 2020-10-05
Attending: EMERGENCY MEDICINE | Admitting: EMERGENCY MEDICINE
Payer: SELF-PAY

## 2020-10-05 VITALS
TEMPERATURE: 98 F | SYSTOLIC BLOOD PRESSURE: 120 MMHG | DIASTOLIC BLOOD PRESSURE: 62 MMHG | HEART RATE: 94 BPM | RESPIRATION RATE: 18 BRPM | OXYGEN SATURATION: 100 %

## 2020-10-05 VITALS — HEIGHT: 68 IN

## 2020-10-05 DIAGNOSIS — K04.7 PERIAPICAL ABSCESS WITHOUT SINUS: ICD-10-CM

## 2020-10-05 DIAGNOSIS — Z88.0 ALLERGY STATUS TO PENICILLIN: ICD-10-CM

## 2020-10-05 DIAGNOSIS — K08.89 OTHER SPECIFIED DISORDERS OF TEETH AND SUPPORTING STRUCTURES: ICD-10-CM

## 2020-10-05 DIAGNOSIS — F41.9 ANXIETY DISORDER, UNSPECIFIED: ICD-10-CM

## 2020-10-05 DIAGNOSIS — E03.9 HYPOTHYROIDISM, UNSPECIFIED: ICD-10-CM

## 2020-10-05 PROCEDURE — 99282 EMERGENCY DEPT VISIT SF MDM: CPT

## 2020-10-05 NOTE — ED PROVIDER NOTE - OBJECTIVE STATEMENT
43 y/o male with hx Hypothyroid, Anxiety presents  to the ED c/o "I have right upper dental pain with facial swelling for 2 days." no fever/ chills/ weakness

## 2020-10-05 NOTE — ED ADULT NURSE NOTE - OBJECTIVE STATEMENT
Pt presented to ED c/o dental pain. Pt has R upper dental pain/facial swelling x 2 days. Denies n/v/d/fevers/chills. Pt A&Ox4, ambulatory.

## 2020-10-05 NOTE — ED PROVIDER NOTE - ATTENDING CONTRIBUTION TO CARE
44M no pmh p/w R upper dental pain since yesterday. sharp constant nonradiating. no fever. + swelling associated. no trouble speaking or swallowing. no numbness, weakness. lost insurance, has no dentist. no cp, sob. has had abscess before, feels the same.     on exam, AFVSS, well eduarda nad, ncat, eomi, perrla, mmm, R upper front dental abscess tooth 4-6, aaox3, no focal deficits, no le edema or calf ttp,     a/p; Dental abscess will send pt to dental clinic for eval.

## 2020-10-05 NOTE — CONSULT NOTE ADULT - SUBJECTIVE AND OBJECTIVE BOX
Pt ambulated with steady gait to Red 4.  RN agrees with triage note.  No trauma to area, CMS intact. 5/10 L arm pain.     Patient is a 44y old  Male who presents with a chief complaint of pain and swelling maxillary right since yesterday.    HPI: Patient stated that yesterday he started experiencing pain and swelling in his upper right.       PAST MEDICAL & SURGICAL HISTORY:  Hypothyroid  Patient stated that he experiences seizures whenever he has a procedure done that requires local anesthesia.    No significant past surgical history      ( -  ) heart valve replacement  ( -  ) joint replacement      MEDICATIONS  (STANDING): levothyroxine    MEDICATIONS  (PRN): denies    Allergies    penicillin (Unknown)    FAMILY HISTORY:  No pertinent family history in first degree relatives    Vital Signs Last 24 Hrs  T(C): 36.8 (05 Oct 2020 11:57), Max: 36.8 (05 Oct 2020 11:57)  T(F): 98.2 (05 Oct 2020 11:57), Max: 98.2 (05 Oct 2020 11:57)  HR: 94 (05 Oct 2020 11:57) (94 - 94)  BP: 120/62 (05 Oct 2020 11:57) (120/62 - 120/62)  BP(mean): --  RR: 18 (05 Oct 2020 11:57) (18 - 18)  SpO2: 100% (05 Oct 2020 11:57) (100% - 100%)    Vitals in dental clinic:  temperature: 98.3  blood pressure: 122/60  pulse: 74    Last Dental Visit: Patient stated that his last dental visit was when he came as an emergency patient at [facility] dental clinic 5 years ago.    EOE:  TMJ ( -  ) clicks                     (  - ) pops                     (   -) crepitus             Mandible <<FROM>>             Facial bones and MOM <<grossly intact>>             ( -  ) trismus             (  - ) lymphadenopathy             (  - ) swelling             (  - ) asymmetry             (  - ) palpation             (  - ) dyspnea             (  - ) dysphagia             (   -) loss of consciousness    IOE:  <<permanent>> dentition:            <<multiple carious teeth>> OR             <<multiple missing teeth>>                     Caries <<  Grossly decayed/non-restorable teeth #4, 5, 6. >>                hard/soft palate:  ( -  ) palatal torus, <<No pathology noted>>            tongue/FOM <<No pathology noted>>            labial/buccal mucosa <<No pathology noted>>           ( -  ) percussion           (  - ) palpation           (  + ) swelling (Present on maxillary right midface extending from upper lip to lower portion of right eye.)           (  - ) abscess           (  - ) sinus tract    *DENTAL RADIOGRAPHS: periapical taken of teeth #4, 5, 6.    RADIOLOGY & ADDITIONAL STUDIES: none    *ASSESSMENT: Patient presented with swelling of maxillary right midface extending from upper lip to lower portion of right eye. Patient had slight sensitivity to percussion of teeth #5, 6. Tooth #4 was grossly decayed down to the gingival margin and non-restorable. Teeth #5, 6 were decayed and non-restorable. Swelling was firm. No drainage from swelling intraorally. No trismus, lymphadenopathy, difficulty breathing or swallowing present.    *PLAN: Offered patient to extract teeth #4, 5, 6 today. Patient stated that he does not want any dental treatment done without being put to sleep due to his history with seizures whenever he has local anesthesia and wanted to take antibiotics for now. Patient stated that he just applied for medicaid insurance and is waiting on that to schedule somewhere to have teeth removed.    RECOMMENDATIONS:  1) Clindamycin 300 mg q6h for 7 days  2) Dental F/U with outpatient dentist for comprehensive dental care.   3) If any difficulty swallowing/breathing, fever occur, return to ER.     Resident Name, pager # Patient is a 44y old  Male who presents with a chief complaint of pain and swelling maxillary right since yesterday.    HPI: Patient stated that yesterday he started experiencing pain and swelling in his upper right.       PAST MEDICAL & SURGICAL HISTORY:  Hypothyroid  Patient stated that he experiences seizures whenever he has a procedure done that requires local anesthesia.    No significant past surgical history      ( -  ) heart valve replacement  ( -  ) joint replacement      MEDICATIONS  (STANDING): levothyroxine    MEDICATIONS  (PRN): denies    Allergies    penicillin (Unknown)    FAMILY HISTORY:  No pertinent family history in first degree relatives    Vital Signs Last 24 Hrs  T(C): 36.8 (05 Oct 2020 11:57), Max: 36.8 (05 Oct 2020 11:57)  T(F): 98.2 (05 Oct 2020 11:57), Max: 98.2 (05 Oct 2020 11:57)  HR: 94 (05 Oct 2020 11:57) (94 - 94)  BP: 120/62 (05 Oct 2020 11:57) (120/62 - 120/62)  BP(mean): --  RR: 18 (05 Oct 2020 11:57) (18 - 18)  SpO2: 100% (05 Oct 2020 11:57) (100% - 100%)    Vitals in dental clinic:  temperature: 98.3  blood pressure: 122/60  pulse: 74    Last Dental Visit: Patient stated that his last dental visit was when he came as an emergency patient at [facility] dental clinic 5 years ago.    EOE:  TMJ ( -  ) clicks                     (  - ) pops                     (   -) crepitus             Mandible <<FROM>>             Facial bones and MOM <<grossly intact>>             ( -  ) trismus             (  - ) lymphadenopathy             (  - ) swelling             (  - ) asymmetry             (  - ) palpation             (  - ) dyspnea             (  - ) dysphagia             (   -) loss of consciousness    IOE:  <<permanent>> dentition:            <<multiple carious teeth>> OR             <<multiple missing teeth>>                     Caries <<  Grossly decayed/non-restorable teeth #4, 5, 6. >>                hard/soft palate:  ( -  ) palatal torus, <<No pathology noted>>            tongue/FOM <<No pathology noted>>            labial/buccal mucosa <<No pathology noted>>           ( -  ) percussion           (  - ) palpation           (  + ) swelling (Present on maxillary right midface extending from upper lip to lower portion of right eye.)           (  - ) abscess           (  - ) sinus tract    *DENTAL RADIOGRAPHS: periapical taken of teeth #4, 5, 6.    RADIOLOGY & ADDITIONAL STUDIES: none    *ASSESSMENT: Patient presented with swelling of maxillary right midface extending from upper lip to lower portion of right eye. Patient had slight sensitivity to percussion of teeth #5, 6. Tooth #4 was grossly decayed down to the gingival margin and non-restorable. Teeth #5, 6 were decayed and non-restorable. Swelling was firm. No drainage from swelling intraorally. No trismus, lymphadenopathy, difficulty breathing or swallowing present.    *PLAN: Offered patient to extract teeth #4, 5, 6 today. Patient stated that he does not want any dental treatment done without being put to sleep due to his history with seizures whenever he has local anesthesia and wanted to take antibiotics for now. Patient stated that he just applied for medicaid insurance and is waiting on that to schedule somewhere to have teeth removed. Referred patient to [facility] and handed patient a form with office name, address, and phone number.    RECOMMENDATIONS:  1) Clindamycin 300 mg q6h for 7 days  2) Dental F/U with outpatient dentist for comprehensive dental care.   3) If any difficulty swallowing/breathing, fever occur, return to ER.     Resident Name, pager #

## 2020-10-24 NOTE — ED ADULT NURSE NOTE - CAS EDN DISCHARGE ASSESSMENT
Admission Reconciliation is Not Complete  Discharge Reconciliation is Completed
Alert and oriented to person, place and time

## 2021-03-08 NOTE — ED ADULT TRIAGE NOTE - AS O2 DELIVERY
----- Message from Layla Dumont PA-C sent at 3/7/2021  4:32 PM EST -----  TB test negative   room air

## 2023-02-08 NOTE — ED ADULT TRIAGE NOTE - ARRIVAL FROM
Will identify 2 coping skills that help mitigate hallucinations
Will identify 2 coping skills that help mitigate hallucinations
Home
Will identify 2 coping skills that help mitigate hallucinations

## 2023-06-01 NOTE — ED PROVIDER NOTE - RESPIRATORY, MLM
reports no blurry vision of difficulty out of L eye/normal
Breath sounds clear and equal bilaterally.

## 2023-10-17 NOTE — ED ADULT NURSE NOTE - CAS EDP DISCH TYPE
ProMedica Memorial Hospital [facility] for a sleep medicine evaluation with concerns of No chief complaint on file..     The patient history of BPD, anxiety    PAST SLEEP HISTORY    Patient has the following sleep-related diagnoses: insomnia, RLS Prior sleep study results: none    CURRENT HISTORY    On today's visit, the patient reports trouble falling asleep    Taking xanax 1 mg up from 0.5 mg 6 months ago  Seroquel 400 mg but currently changing to risperidone  Melatonin 10 mg  Also CBD oil which he thinks is helping    Has RLS which is bothersome. Feels he has a had a long time. Doesn't feel worse with resting.    Wakes up 3-4 am at times and too late to take medications to fall back to sleep    He snores, denies apneas, feels tired during the day    STOP 2  BANG 2  ESS 0    Sleep schedule  on weekdays / work days:  Usual Bedtime  8 pm  Falls asleep around  9 pm  Wake time  5 am  8 hours/day    Sleep schedule  on weekends/non work days :  Usual Bedtime  8 pm  Falls asleep around 9 pm  Wake time  7 am  10-11 hours/day    Preferred sleeping position: SLEEP POSITION: supine and sidelying    Sleep-related ROS:    Sleep Initiation: Sleep initiation: takes 1 hour to fall asleep  Problems going to sleep associated with: Prob Falling Asleep: rumination/thoughts/worries and sometimes unsure    Sleep Maintenance: prolonged awakenings and wakes up about 5 times per night  Problems staying asleep associated with: Problems Staying Asleep: no clear reason    Breathing during sleep: Breathing during sleep: snoring    Sleep-related behaviors: Feels sometimes acts out dreams    Daytime Symptoms  On awakening patient reports: dry mouth    Patient report some daytime symptoms including:  Outpatient Medications   Medication Sig Dispense Refill    atorvastatin (Lipitor) 40 mg tablet Take by mouth.      buPROPion XL (Wellbutrin XL) 150 mg 24 hr tablet Take 1 tablet (150 mg) by mouth once daily.      clonazePAM (KlonoPIN) 0.5 mg tablet Take 1 tablet (0.5 mg) by mouth once daily as needed for anxiety.      hydrOXYzine HCL (Atarax) 25 mg tablet Take by mouth. TAKE 2 TABLETS IN THE MORNING AND 3 TABLETS AT BEDTIME.      metoprolol succinate XL (Toprol-XL) 50 mg 24 hr tablet Take 1 tablet (50 mg) by mouth once daily.      risperiDONE (RisperDAL) 1 mg tablet Take 1 tablet (1 mg) by mouth 3 times a day.      venlafaxine XR (Effexor-XR) 75 mg 24 hr capsule Take 1 capsule (75 mg) by mouth once daily.       No current facility-administered medications for this visit.         PAST HISTORY     PAST MEDICAL HISTORY  He  has no past medical history on file.    PAST SURGICAL HISTORY:  Past Surgical History:   Procedure Laterality Date    OTHER SURGICAL HISTORY  01/03/2020    Carbon tooth extraction    OTHER SURGICAL HISTORY  01/03/2020    Nasal polypectomy       FAMILY HISTORY  Family History   Problem Relation Name Age of Onset    Alcohol abuse Father      Alcohol abuse Sister      Alcohol abuse Brother      Drug abuse Brother           SOCIAL HISTORY  He  has no history on file for tobacco use, alcohol use, and drug use. He currently lives alone.     Caffeine consumption:  green tea in AM  Alcohol consumption: none  Smoking: never  Marijuana: no      PHYSICAL EXAM     VITAL SIGNS: There were no vitals taken for this visit.     CURRENT WEIGHT: [unfilled]  BMI: [unfilled]  PREVIOUS WEIGHTS:  Wt Readings from Last 3 Encounters:   02/13/20 111 kg (245 lb)   01/03/20 106 kg (234 lb)  "      PHYSICAL EXAM: GENERAL: alert oriented x 3 pleasant and cooperative no acute distress  MODIFIED KEARNS SCORE:   MODIFIED MALLAMPATI SCORE:   UVULA: midline  LATERAL PHARYNGEAL WALL:   TONSILLAR SCORE:   TONGUE SCALLOPING: normal  NECK EXAM: normal supple no adenopathy    RESULTS/DATA     No results found for: \"IRON\", \"TRANSFERRIN\", \"IRONSAT\", \"TIBC\", \"FERRITIN\"            ASSESSMENT/PLAN     Mr. Espinoza is a 48 y.o. male and He was referred to the [facility] for the following issues:    OBSTRUCTIVE SLEEP APNEA, SUSPECTED / INSOMNIA, ONSET AND MAINTENANCE / RBD  -Ordering in-lab sleep test  -STOP BANG score of 4, ESS ?0  -Discussed symptoms and risks of untreated sleep apnea  -Followup 2-3 weeks after sleep study to review results  -Goal of treatment would be less waking, less daytime sleepiness    PLMS / RLS  -Ordering ferritin/iron to evaluate possibly deficiency  -May benefit from changing medications - on multiple meds that can worsen RLS  -denies caffeine, ETOH use  -May consider GBP for RLS in future               " Home

## 2024-02-13 NOTE — ED ADULT NURSE NOTE - TEMPLATE
She met with neurosurgeon and he said the problem is not affecting her eyes and she wants to know if she still needs to see the eye doctor.  Also, where is she to go for the same day infusion?   General
